# Patient Record
Sex: FEMALE | Race: ASIAN | NOT HISPANIC OR LATINO | URBAN - METROPOLITAN AREA
[De-identification: names, ages, dates, MRNs, and addresses within clinical notes are randomized per-mention and may not be internally consistent; named-entity substitution may affect disease eponyms.]

---

## 2022-08-29 ENCOUNTER — EMERGENCY (EMERGENCY)
Facility: HOSPITAL | Age: 57
LOS: 1 days | Discharge: HOME | End: 2022-08-29
Attending: EMERGENCY MEDICINE | Admitting: EMERGENCY MEDICINE

## 2022-08-29 VITALS
OXYGEN SATURATION: 100 % | DIASTOLIC BLOOD PRESSURE: 67 MMHG | HEART RATE: 70 BPM | SYSTOLIC BLOOD PRESSURE: 136 MMHG | RESPIRATION RATE: 16 BRPM | TEMPERATURE: 98 F

## 2022-08-29 LAB
ALBUMIN SERPL ELPH-MCNC: 4.6 G/DL — SIGNIFICANT CHANGE UP (ref 3.3–5)
ALP SERPL-CCNC: 78 U/L — SIGNIFICANT CHANGE UP (ref 40–120)
ALT FLD-CCNC: 19 U/L — SIGNIFICANT CHANGE UP (ref 4–33)
ANION GAP SERPL CALC-SCNC: 9 MMOL/L — SIGNIFICANT CHANGE UP (ref 7–14)
AST SERPL-CCNC: 21 U/L — SIGNIFICANT CHANGE UP (ref 4–32)
BASOPHILS # BLD AUTO: 0.03 K/UL — SIGNIFICANT CHANGE UP (ref 0–0.2)
BASOPHILS NFR BLD AUTO: 0.5 % — SIGNIFICANT CHANGE UP (ref 0–2)
BILIRUB SERPL-MCNC: 0.3 MG/DL — SIGNIFICANT CHANGE UP (ref 0.2–1.2)
BUN SERPL-MCNC: 15 MG/DL — SIGNIFICANT CHANGE UP (ref 7–23)
CALCIUM SERPL-MCNC: 10 MG/DL — SIGNIFICANT CHANGE UP (ref 8.4–10.5)
CHLORIDE SERPL-SCNC: 104 MMOL/L — SIGNIFICANT CHANGE UP (ref 98–107)
CO2 SERPL-SCNC: 27 MMOL/L — SIGNIFICANT CHANGE UP (ref 22–31)
CREAT SERPL-MCNC: 0.75 MG/DL — SIGNIFICANT CHANGE UP (ref 0.5–1.3)
EGFR: 92 ML/MIN/1.73M2 — SIGNIFICANT CHANGE UP
EOSINOPHIL # BLD AUTO: 0.29 K/UL — SIGNIFICANT CHANGE UP (ref 0–0.5)
EOSINOPHIL NFR BLD AUTO: 5.2 % — SIGNIFICANT CHANGE UP (ref 0–6)
GLUCOSE SERPL-MCNC: 96 MG/DL — SIGNIFICANT CHANGE UP (ref 70–99)
HCT VFR BLD CALC: 40.3 % — SIGNIFICANT CHANGE UP (ref 34.5–45)
HGB BLD-MCNC: 13.1 G/DL — SIGNIFICANT CHANGE UP (ref 11.5–15.5)
IANC: 2.93 K/UL — SIGNIFICANT CHANGE UP (ref 1.8–7.4)
IMM GRANULOCYTES NFR BLD AUTO: 0.4 % — SIGNIFICANT CHANGE UP (ref 0–1.5)
LYMPHOCYTES # BLD AUTO: 1.88 K/UL — SIGNIFICANT CHANGE UP (ref 1–3.3)
LYMPHOCYTES # BLD AUTO: 33.6 % — SIGNIFICANT CHANGE UP (ref 13–44)
MCHC RBC-ENTMCNC: 31.1 PG — SIGNIFICANT CHANGE UP (ref 27–34)
MCHC RBC-ENTMCNC: 32.5 GM/DL — SIGNIFICANT CHANGE UP (ref 32–36)
MCV RBC AUTO: 95.7 FL — SIGNIFICANT CHANGE UP (ref 80–100)
MONOCYTES # BLD AUTO: 0.45 K/UL — SIGNIFICANT CHANGE UP (ref 0–0.9)
MONOCYTES NFR BLD AUTO: 8 % — SIGNIFICANT CHANGE UP (ref 2–14)
NEUTROPHILS # BLD AUTO: 2.93 K/UL — SIGNIFICANT CHANGE UP (ref 1.8–7.4)
NEUTROPHILS NFR BLD AUTO: 52.3 % — SIGNIFICANT CHANGE UP (ref 43–77)
NRBC # BLD: 0 /100 WBCS — SIGNIFICANT CHANGE UP (ref 0–0)
NRBC # FLD: 0 K/UL — SIGNIFICANT CHANGE UP (ref 0–0)
PLATELET # BLD AUTO: 236 K/UL — SIGNIFICANT CHANGE UP (ref 150–400)
POTASSIUM SERPL-MCNC: 4.5 MMOL/L — SIGNIFICANT CHANGE UP (ref 3.5–5.3)
POTASSIUM SERPL-SCNC: 4.5 MMOL/L — SIGNIFICANT CHANGE UP (ref 3.5–5.3)
PROT SERPL-MCNC: 8.6 G/DL — HIGH (ref 6–8.3)
RBC # BLD: 4.21 M/UL — SIGNIFICANT CHANGE UP (ref 3.8–5.2)
RBC # FLD: 12.4 % — SIGNIFICANT CHANGE UP (ref 10.3–14.5)
SODIUM SERPL-SCNC: 140 MMOL/L — SIGNIFICANT CHANGE UP (ref 135–145)
WBC # BLD: 5.6 K/UL — SIGNIFICANT CHANGE UP (ref 3.8–10.5)
WBC # FLD AUTO: 5.6 K/UL — SIGNIFICANT CHANGE UP (ref 3.8–10.5)

## 2022-08-29 PROCEDURE — 99284 EMERGENCY DEPT VISIT MOD MDM: CPT

## 2022-08-29 NOTE — ED PROVIDER NOTE - CLINICAL SUMMARY MEDICAL DECISION MAKING FREE TEXT BOX
Generalized pruritis after eating.  will check labs, including bilirubin.  will recommend outpatient followup with dermatology and allergy/immunology.

## 2022-08-29 NOTE — ED ADULT TRIAGE NOTE - CHIEF COMPLAINT QUOTE
Pt c/o itching to skin for the past several months, states she thinks it's an allergic reaction, unsure to cause. Denies PMH. Denies rash.

## 2022-08-29 NOTE — ED PROVIDER NOTE - NSFOLLOWUPINSTRUCTIONS_ED_ALL_ED_FT
Follow up with your primary care doctor in 2-3 days.    Return to the ED for any emergent concerns. Follow up with your primary care doctor in 2-3 days.    Return to the ED for any emergent concerns.    Use benadryl 25mg every 6 hours as needed for itching.

## 2022-08-29 NOTE — ED PROVIDER NOTE - PATIENT PORTAL LINK FT
You can access the FollowMyHealth Patient Portal offered by [facility] by registering at the following website: [email or web address]. By joining [employer]’s FollowMyHealth portal, you will also be able to view your health information using other applications (apps) compatible with our system.

## 2022-08-29 NOTE — ED ADULT NURSE NOTE - OBJECTIVE STATEMENT
Receive pt. in Intake room 13 alert and oriented x 4, presenting to the ER with complaints of Receive pt. in Intake room 13 alert and oriented x 4, presenting to the ER with complaints of rashes on her body with itching. Pt. stated " I have rashes on my body and I have some itching in my buttocks for four months". Bilateral arms with small reddened areas, no broken skin noted, labs sent as ordered.

## 2023-08-07 ENCOUNTER — EMERGENCY (EMERGENCY)
Facility: HOSPITAL | Age: 58
LOS: 1 days | Discharge: HOME | End: 2023-08-07
Attending: EMERGENCY MEDICINE | Admitting: EMERGENCY MEDICINE
Payer: COMMERCIAL

## 2023-08-07 VITALS
OXYGEN SATURATION: 100 % | RESPIRATION RATE: 16 BRPM | DIASTOLIC BLOOD PRESSURE: 65 MMHG | SYSTOLIC BLOOD PRESSURE: 125 MMHG | TEMPERATURE: 98 F | HEART RATE: 78 BPM

## 2023-08-07 PROCEDURE — 99283 EMERGENCY DEPT VISIT LOW MDM: CPT

## 2023-08-07 NOTE — ED PROVIDER NOTE - PATIENT PORTAL LINK FT
You can access the FollowMyHealth Patient Portal offered by [facility] by registering at the following website: [email or web address]. By joining [employer]’s FollowMyHealth portal, you will also be able to view your health information using other applications (apps) compatible with our system.

## 2023-08-07 NOTE — ED PROVIDER NOTE - CLINICAL SUMMARY MEDICAL DECISION MAKING FREE TEXT BOX
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  Patient has had symptoms for 2 days.  Has not taken any medications for symptoms and refusing medications here as she was recently told she has multiple allergies to medications and food.  At this time patient requesting just COVID swab and discharge home and work note.  Will RVP swab and text results.  Return precautions explained understood.

## 2023-08-07 NOTE — ED ADULT TRIAGE NOTE - CHIEF COMPLAINT QUOTE
Pt c/o sore thraot x 2 days. Endorses body aches, runny nose cough. Denies fever, nausea, vomiting, diarrhea, chest pain, sob. Pt's coworkers tested positive for covid. Past Medical History: GERD

## 2023-08-07 NOTE — ED PROVIDER NOTE - PHYSICAL EXAMINATION
No acute distress oropharynx is normal uvula is midline no erythema in the back of the throat no discharge.  No lymphadenopathy in the cervical region.  Heart is regular rate and rhythm lungs are clear to auscultation bilaterally.  TMs bilaterally are normal and clear.

## 2023-08-07 NOTE — ED PROVIDER NOTE - OBJECTIVE STATEMENT
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  Symptoms have been ongoing for 2 days.  Patient is here to get COVID swab as she is concerned as multiple coworkers have COVID recently.  Denies any weakness, chest pain, shortness of breath, cough, URI, UTI symptoms.  He is tolerating p.o., no nausea, vomiting, diarrhea.  Otherwise well.  Non-smoker.

## 2023-08-07 NOTE — ED PROVIDER NOTE - NSFOLLOWUPINSTRUCTIONS_ED_ALL_ED_FT
You were seen in the ED for UPPER RESPIRATORY INFECTION SYMPTOMS    It was determined that you have no life threatening injuries or condition that requires you to be admitted to the hospital or have any additional testing while in the ED.     Our hospital laboratory will text your results to your phone.   If you do not receive a text with the results, you can call [phone] to find out results tomorrow.     Please follow up with your primary care doctor so that they can determine if you need any additional testing or treatment as an outpatient.     If your symptoms worsen or change, you can return to the ED for further evaluation and care at that time.

## 2023-08-07 NOTE — ED ADULT NURSE NOTE - OBJECTIVE STATEMENT
pt comes in to be tested for covid after being exposed by 2 workers. pt has 3 days of sore throat and runny nose. denies any known fevers. respirations even and unlabored. no chest pain.

## 2023-08-08 PROBLEM — Z78.9 OTHER SPECIFIED HEALTH STATUS: Chronic | Status: ACTIVE | Noted: 2022-08-29

## 2023-08-08 LAB

## 2023-08-08 NOTE — ED POST DISCHARGE NOTE - REASON FOR FOLLOW-UP
patient called for COVID test result f/u. covid Positive. feeling well, no SOB. 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 recommending 5 day quarantine, and may return to work with mask for 5 days after return for total of 10 days. Other

## 2024-03-07 ENCOUNTER — OUTPATIENT (OUTPATIENT)
Dept: OUTPATIENT SERVICES | Facility: HOSPITAL | Age: 59
LOS: 1 days | End: 2024-03-07
Payer: COMMERCIAL

## 2024-03-07 VITALS
DIASTOLIC BLOOD PRESSURE: 68 MMHG | HEART RATE: 61 BPM | HEIGHT: 65 IN | RESPIRATION RATE: 17 BRPM | WEIGHT: 139.99 LBS | TEMPERATURE: 98 F | SYSTOLIC BLOOD PRESSURE: 152 MMHG | OXYGEN SATURATION: 98 %

## 2024-03-07 VITALS
OXYGEN SATURATION: 99 % | RESPIRATION RATE: 19 BRPM | DIASTOLIC BLOOD PRESSURE: 73 MMHG | SYSTOLIC BLOOD PRESSURE: 144 MMHG | HEART RATE: 62 BPM

## 2024-03-07 DIAGNOSIS — K21.9 GASTRO-ESOPHAGEAL REFLUX DISEASE WITHOUT ESOPHAGITIS: ICD-10-CM

## 2024-03-07 DIAGNOSIS — Z12.11 ENCOUNTER FOR SCREENING FOR MALIGNANT NEOPLASM OF COLON: ICD-10-CM

## 2024-03-07 PROCEDURE — 88305 TISSUE EXAM BY PATHOLOGIST: CPT | Mod: 26

## 2024-03-07 PROCEDURE — 43239 EGD BIOPSY SINGLE/MULTIPLE: CPT

## 2024-03-07 PROCEDURE — G0121: CPT

## 2024-03-07 PROCEDURE — 88342 IMHCHEM/IMCYTCHM 1ST ANTB: CPT | Mod: 26

## 2024-03-07 PROCEDURE — 88305 TISSUE EXAM BY PATHOLOGIST: CPT

## 2024-03-07 PROCEDURE — 88342 IMHCHEM/IMCYTCHM 1ST ANTB: CPT

## 2024-03-07 DEVICE — NET RETRV ROT ROTH 2.5MMX230CM: Type: IMPLANTABLE DEVICE | Status: FUNCTIONAL

## 2024-03-07 RX ORDER — OXYCODONE HYDROCHLORIDE 5 MG/1
10 TABLET ORAL ONCE
Refills: 0 | Status: DISCONTINUED | OUTPATIENT
Start: 2024-03-07 | End: 2024-03-07

## 2024-03-07 RX ORDER — ACETAMINOPHEN 500 MG
1000 TABLET ORAL ONCE
Refills: 0 | Status: DISCONTINUED | OUTPATIENT
Start: 2024-03-07 | End: 2024-03-21

## 2024-03-07 RX ORDER — FAMOTIDINE 10 MG/ML
1 INJECTION INTRAVENOUS
Refills: 0 | DISCHARGE

## 2024-03-07 RX ORDER — ASCORBIC ACID 60 MG
1 TABLET,CHEWABLE ORAL
Refills: 0 | DISCHARGE

## 2024-03-07 RX ORDER — FENTANYL CITRATE 50 UG/ML
50 INJECTION INTRAVENOUS
Refills: 0 | Status: DISCONTINUED | OUTPATIENT
Start: 2024-03-07 | End: 2024-03-07

## 2024-03-07 RX ORDER — OXYCODONE HYDROCHLORIDE 5 MG/1
5 TABLET ORAL ONCE
Refills: 0 | Status: DISCONTINUED | OUTPATIENT
Start: 2024-03-07 | End: 2024-03-07

## 2024-03-07 RX ORDER — ONDANSETRON 8 MG/1
4 TABLET, FILM COATED ORAL ONCE
Refills: 0 | Status: DISCONTINUED | OUTPATIENT
Start: 2024-03-07 | End: 2024-03-21

## 2024-03-07 RX ORDER — SODIUM CHLORIDE 9 MG/ML
500 INJECTION INTRAMUSCULAR; INTRAVENOUS; SUBCUTANEOUS
Refills: 0 | Status: COMPLETED | OUTPATIENT
Start: 2024-03-07 | End: 2024-03-07

## 2024-03-07 RX ADMIN — SODIUM CHLORIDE 30 MILLILITER(S): 9 INJECTION INTRAMUSCULAR; INTRAVENOUS; SUBCUTANEOUS at 07:55

## 2024-03-07 NOTE — PRE PROCEDURE NOTE - PRE PROCEDURE EVALUATION
Attending Physician:  Yanni Walters MD                          Procedure:  EGD and colonoscopy    Indication for Procedure:  Acid reflux, baseline screening colonoscopy  ________________________________________________________  PAST MEDICAL & SURGICAL HISTORY:  No pertinent past medical history      ALLERGIES:  Zithromax (Unknown)    HOME MEDICATIONS:  Centrum Singles-Vitamin C 500 mg oral tablet: 1 tab(s) orally once a day  Pepcid 20 mg oral tablet: 1 tab(s) orally once a day    AICD/PPM: [ ] yes   [X] no    PERTINENT LAB DATA:                      PHYSICAL EXAMINATION:    Height (cm): 165.1  Weight (kg): 63.5  BMI (kg/m2): 23.3  BSA (m2): 1.7T(C): 36.7  HR: 61  BP: 152/68  RR: 17  SpO2: 98%    Constitutional: NAD    Neck:  No JVD  Respiratory: CTAB/L  Cardiovascular: S1 and S2  Gastrointestinal: BS+, soft, NT/ND  Extremities: No peripheral edema  Neurological: A/O x 3, no focal deficits        COMMENTS:    The patient is a suitable candidate for the planned procedure unless box checked [ ]  No, explain:

## 2024-03-18 LAB — SURGICAL PATHOLOGY STUDY: SIGNIFICANT CHANGE UP

## (undated) DEVICE — BITE BLOCK ADULT 20 X 27MM (GREEN)

## (undated) DEVICE — PROBE FIAPC DIA 2.3MM/7FR LNTH 220CM/7.2FT

## (undated) DEVICE — POLY TRAP ETRAP

## (undated) DEVICE — FORCEP RADIAL JAW 4 HOT BIOPSY DISP

## (undated) DEVICE — FORCEP RADIAL JAW 4 JUMBO 2.8MM 3.2MM 240CM ORANGE DISP

## (undated) DEVICE — BALLOON US ENDO

## (undated) DEVICE — CATH ELCTR GLD PRB 10FR

## (undated) DEVICE — FOLEY HOLDER STATLOCK 2 WAY ADULT

## (undated) DEVICE — SNARE POLYP MINI ACCUSNR 1.5 X 3CM

## (undated) DEVICE — BIOPSY FORCEP RADIAL JAW 4 STANDARD WITH NEEDLE

## (undated) DEVICE — CATH ELECROHEM GLD PROBE 7FR

## (undated) DEVICE — SUCTION YANKAUER NO CONTROL VENT

## (undated) DEVICE — FORCEP MULTIBITE MULTIPLE SAMPLE 2.4MM 2.8MM 240CM ORANGE DISP

## (undated) DEVICE — SNARE POLYP SENS 27MM 240CM

## (undated) DEVICE — TRAP SPECIMEN SPUTUM 40CC

## (undated) DEVICE — TUBING SUCTION CONN 6FT STERILE

## (undated) DEVICE — NDL INJ SCLERO INTERJECT 23G

## (undated) DEVICE — CATH IV SAFE BC 20G X 1.16" (PINK)

## (undated) DEVICE — SYR ALLIANCE II INFLATION 60ML

## (undated) DEVICE — SOL INJ NS 0.9% 500ML 2 PORT

## (undated) DEVICE — ELCTR GROUNDING PAD ADULT COVIDIEN

## (undated) DEVICE — PACK IV START WITH CHG

## (undated) DEVICE — TUBING SUCTION 20FT

## (undated) DEVICE — TUBING IV SET GRAVITY 3Y 100" MACRO

## (undated) DEVICE — Device

## (undated) DEVICE — CATH IV SAFE BC 22G X 1" (BLUE)

## (undated) DEVICE — TUBING CAP SET ENDO 24HR USE GI

## (undated) DEVICE — SNARE POLYP SENS SM 13MM 240CM

## (undated) DEVICE — IRRIGATOR BIO SHIELD

## (undated) DEVICE — SENSOR O2 FINGER ADULT

## (undated) DEVICE — BRUSH COLONOSCOPY CYTOLOGY

## (undated) DEVICE — CLAMP BX HOT RAD JAW 3

## (undated) DEVICE — NDL INJ SCLERO INTERJECT 25G

## (undated) DEVICE — SYR LUER LOK 50CC